# Patient Record
(demographics unavailable — no encounter records)

---

## 2025-05-02 NOTE — HISTORY OF PRESENT ILLNESS
[de-identified] : 67M who presents with a hearing aid dome stuck in the right ear. He has a hx of bilateral SNHL and wears bilateral hearing aids. No otalgia, otorrhea, vertigo, tinnitus, acute hearing changes. No hx of ear surgery nor ear infections. No nasal or throat complaints.

## 2025-05-02 NOTE — PHYSICAL EXAM
[TextEntry] : General: AAO, no significant distress Psychiatric: affect pleasant and within normal limits Eyes: relatively symmetric, no obvious nystagmus Skin: no significant lesions on face Nose: no significant lesions; patent. Oral Cavity & Oropharynx: no significant deformity or lesions Neck/Lymphatics: no significant masses or abnormal cervical nodes palpated Respiratory: breathing comfortably; no significant distress Neurologic: cranial nerves II-XII grossly intact; EOMI Facial function: symmetric   Ear examination performed under binocular otologic microscope: Left Ear External: Pinna and periauricular area is normal. Canal: Ear canal skin is not inflamed or edematous. Left cerumen impaction cleaned under microscopy with instrumentation Tympanic Membrane: Intact and in good position.   Right Ear External: Pinna and periauricular area is normal. Canal: Ear canal skin is not inflamed or edematous. Right cerumen impaction cleaned under microscopy with instrumentation Tympanic Membrane: Intact and in good position.   Procedure: Left cerumen removal Pre-operative Diagnosis: Left cerumen impaction Post-operative Diagnosis: Left cerumen impaction Anesthesia: None Procedure Details: The patient was placed in the supine position. The left ear canal was determined to be impacted with cerumen. A curette and/or suction was used to remove the cerumen impaction under microscopy. Condition: Stable. Patient tolerated procedure well. Complications: None.   Procedure: Right cerumen removal Pre-operative Diagnosis: Right cerumen impaction Post-operative Diagnosis: Right cerumen impaction Anesthesia: None Procedure Details: The patient was placed in the supine position. The right ear canal was determined to be impacted with cerumen. A curette and/or suction was used to remove the cerumen impaction under microscopy. Condition: Stable. Patient tolerated procedure well. Complications: None.   Procedure: Right ear foreign body removal Pre-operative Diagnosis: Right ear foreign body Post-operative Diagnosis: Right ear foreign body Anesthesia: None Procedure Details: The patient was placed in the supine position. The right ear canal visualized. The hearing aid dome was visualized and removed under microscopy with alligator forceps. Right TM was intact. Condition: Stable. Patient tolerated procedure well. Complications: None.

## 2025-05-02 NOTE — ASSESSMENT
[FreeTextEntry1] : Bilateral Cerumen Impaction - 1 chronic illness - Bilateral cerumen impaction removed under microscopy with instrumentation  R ear foreign body - s/p hearing aid dome removal from R ear canal - f/u as needed